# Patient Record
Sex: MALE | Race: BLACK OR AFRICAN AMERICAN | HISPANIC OR LATINO | URBAN - METROPOLITAN AREA
[De-identification: names, ages, dates, MRNs, and addresses within clinical notes are randomized per-mention and may not be internally consistent; named-entity substitution may affect disease eponyms.]

---

## 2020-08-26 ENCOUNTER — OUTPATIENT (OUTPATIENT)
Dept: OUTPATIENT SERVICES | Facility: HOSPITAL | Age: 30
LOS: 1 days | Discharge: HOME | End: 2020-08-26
Payer: COMMERCIAL

## 2020-08-26 VITALS
WEIGHT: 255.52 LBS | RESPIRATION RATE: 16 BRPM | TEMPERATURE: 98 F | DIASTOLIC BLOOD PRESSURE: 88 MMHG | OXYGEN SATURATION: 97 % | HEART RATE: 81 BPM | HEIGHT: 74 IN | SYSTOLIC BLOOD PRESSURE: 128 MMHG

## 2020-08-26 DIAGNOSIS — M51.26 OTHER INTERVERTEBRAL DISC DISPLACEMENT, LUMBAR REGION: ICD-10-CM

## 2020-08-26 DIAGNOSIS — Z01.818 ENCOUNTER FOR OTHER PREPROCEDURAL EXAMINATION: ICD-10-CM

## 2020-08-26 LAB
A1C WITH ESTIMATED AVERAGE GLUCOSE RESULT: 5.9 % — HIGH (ref 4–5.6)
ANION GAP SERPL CALC-SCNC: 8 MMOL/L — SIGNIFICANT CHANGE UP (ref 5–17)
APTT BLD: 35.7 SEC — HIGH (ref 27.5–35.5)
BUN SERPL-MCNC: 8 MG/DL — SIGNIFICANT CHANGE UP (ref 7–23)
CALCIUM SERPL-MCNC: 9.2 MG/DL — SIGNIFICANT CHANGE UP (ref 8.5–10.1)
CHLORIDE SERPL-SCNC: 106 MMOL/L — SIGNIFICANT CHANGE UP (ref 96–108)
CO2 SERPL-SCNC: 28 MMOL/L — SIGNIFICANT CHANGE UP (ref 22–31)
CREAT SERPL-MCNC: 0.83 MG/DL — SIGNIFICANT CHANGE UP (ref 0.5–1.3)
ESTIMATED AVERAGE GLUCOSE: 123 MG/DL — HIGH (ref 68–114)
GLUCOSE SERPL-MCNC: 81 MG/DL — SIGNIFICANT CHANGE UP (ref 70–99)
HCT VFR BLD CALC: 45.7 % — SIGNIFICANT CHANGE UP (ref 39–50)
HGB BLD-MCNC: 15 G/DL — SIGNIFICANT CHANGE UP (ref 13–17)
INR BLD: 1.13 RATIO — SIGNIFICANT CHANGE UP (ref 0.88–1.16)
MCHC RBC-ENTMCNC: 25.6 PG — LOW (ref 27–34)
MCHC RBC-ENTMCNC: 32.8 GM/DL — SIGNIFICANT CHANGE UP (ref 32–36)
MCV RBC AUTO: 78 FL — LOW (ref 80–100)
MRSA PCR RESULT.: SIGNIFICANT CHANGE UP
NRBC # BLD: 0 /100 WBCS — SIGNIFICANT CHANGE UP (ref 0–0)
PLATELET # BLD AUTO: 294 K/UL — SIGNIFICANT CHANGE UP (ref 150–400)
POTASSIUM SERPL-MCNC: 3.8 MMOL/L — SIGNIFICANT CHANGE UP (ref 3.5–5.3)
POTASSIUM SERPL-SCNC: 3.8 MMOL/L — SIGNIFICANT CHANGE UP (ref 3.5–5.3)
PROTHROM AB SERPL-ACNC: 13 SEC — SIGNIFICANT CHANGE UP (ref 10.6–13.6)
RBC # BLD: 5.86 M/UL — HIGH (ref 4.2–5.8)
RBC # FLD: 12.6 % — SIGNIFICANT CHANGE UP (ref 10.3–14.5)
S AUREUS DNA NOSE QL NAA+PROBE: DETECTED
SODIUM SERPL-SCNC: 142 MMOL/L — SIGNIFICANT CHANGE UP (ref 135–145)
WBC # BLD: 5.9 K/UL — SIGNIFICANT CHANGE UP (ref 3.8–10.5)
WBC # FLD AUTO: 5.9 K/UL — SIGNIFICANT CHANGE UP (ref 3.8–10.5)

## 2020-08-26 PROCEDURE — 93010 ELECTROCARDIOGRAM REPORT: CPT

## 2020-08-26 NOTE — H&P PST ADULT - NEGATIVE NEUROLOGICAL SYMPTOMS
no generalized seizures/no loss of sensation/no headache/no facial palsy/no focal seizures/no tremors/no hemiparesis/no weakness/no loss of consciousness/no syncope/no transient paralysis/no vertigo/no difficulty walking/no confusion

## 2020-08-26 NOTE — H&P PST ADULT - NSICDXPROBLEM_GEN_ALL_CORE_FT
PROBLEM DIAGNOSES  Problem: Other intervertebral disc displacement, lumbar region  Assessment and Plan: Preop instructions provided including npo status, Hibiclens wash for infection control and mrsa protocol. Pt to c/w current meds, (ozempic ok to continue on sundays 10 am as ordered) aware to stop any NSAIDS, OTC herbals or MVI on 9/2. Verbilized understanding. BW done, pending results. DVT prophylasix as per primary team. Aware to f/u on covid testing prior to sx as per pst protocol and appt provided for 9/6/20.

## 2020-08-26 NOTE — H&P PST ADULT - NEGATIVE MUSCULOSKELETAL SYMPTOMS
no arthritis/no muscle cramps/no joint swelling/no muscle weakness/no leg pain L/no arthralgia/no arm pain L/no myalgia/no neck pain/no arm pain R/no leg pain R

## 2020-08-26 NOTE — H&P PST ADULT - HISTORY OF PRESENT ILLNESS
31 y/o M presents to PST w/ a preop dx of other intervertebral disc displacement, lumbar region and to be evaluated for a scheduled left discectomy L3-4, right discectomy L4-5 with image on 9/9/20. Pt states since age 18 experience pain to back, no injury stated, underwent PT x 3, epidural shoots x 3 w/ partial relief done by pain mgt. pt referred to spine specialist, symptoms worsening including lle/ foot w/ paresthesias, MRI done and revealed herniated disc x 3, pt recommended surgical intervention at this time.

## 2020-08-26 NOTE — H&P PST ADULT - ASSESSMENT
DX: other intervertebral disc displacement, lumbar region and evaluated for a scheduled left discectomy L3-4, right discectomy L4-5 with image on 9/9/20.

## 2020-08-27 RX ORDER — MUPIROCIN 20 MG/G
1 OINTMENT TOPICAL
Qty: 10 | Refills: 0
Start: 2020-08-27 | End: 2020-08-31

## 2020-09-06 ENCOUNTER — OUTPATIENT (OUTPATIENT)
Dept: OUTPATIENT SERVICES | Facility: HOSPITAL | Age: 30
LOS: 1 days | Discharge: HOME | End: 2020-09-06

## 2020-09-06 DIAGNOSIS — U07.1 COVID-19: ICD-10-CM

## 2020-09-06 PROBLEM — E11.9 TYPE 2 DIABETES MELLITUS WITHOUT COMPLICATIONS: Chronic | Status: ACTIVE | Noted: 2020-08-26

## 2020-09-09 ENCOUNTER — OUTPATIENT (OUTPATIENT)
Dept: OUTPATIENT SERVICES | Facility: HOSPITAL | Age: 30
LOS: 1 days | Discharge: HOME | End: 2020-09-09

## 2020-09-09 VITALS
HEART RATE: 69 BPM | DIASTOLIC BLOOD PRESSURE: 79 MMHG | TEMPERATURE: 98 F | HEIGHT: 74 IN | SYSTOLIC BLOOD PRESSURE: 136 MMHG | WEIGHT: 250 LBS | OXYGEN SATURATION: 98 % | RESPIRATION RATE: 16 BRPM

## 2020-09-09 VITALS
HEART RATE: 82 BPM | RESPIRATION RATE: 18 BRPM | OXYGEN SATURATION: 98 % | SYSTOLIC BLOOD PRESSURE: 125 MMHG | DIASTOLIC BLOOD PRESSURE: 64 MMHG

## 2020-09-09 RX ORDER — SEMAGLUTIDE 0.68 MG/ML
1.5 INJECTION, SOLUTION SUBCUTANEOUS
Qty: 0 | Refills: 0 | DISCHARGE

## 2020-09-09 RX ORDER — SODIUM CHLORIDE 9 MG/ML
1000 INJECTION, SOLUTION INTRAVENOUS
Refills: 0 | Status: DISCONTINUED | OUTPATIENT
Start: 2020-09-09 | End: 2020-09-09

## 2020-09-09 RX ORDER — ACETAMINOPHEN 500 MG
1000 TABLET ORAL ONCE
Refills: 0 | Status: COMPLETED | OUTPATIENT
Start: 2020-09-09 | End: 2020-09-09

## 2020-09-09 RX ORDER — HYDROMORPHONE HYDROCHLORIDE 2 MG/ML
0.5 INJECTION INTRAMUSCULAR; INTRAVENOUS; SUBCUTANEOUS
Refills: 0 | Status: DISCONTINUED | OUTPATIENT
Start: 2020-09-09 | End: 2020-09-09

## 2020-09-09 RX ADMIN — HYDROMORPHONE HYDROCHLORIDE 0.5 MILLIGRAM(S): 2 INJECTION INTRAMUSCULAR; INTRAVENOUS; SUBCUTANEOUS at 11:18

## 2020-09-09 RX ADMIN — Medication 400 MILLIGRAM(S): at 10:44

## 2020-09-09 RX ADMIN — SODIUM CHLORIDE 75 MILLILITER(S): 9 INJECTION, SOLUTION INTRAVENOUS at 10:44

## 2020-09-09 RX ADMIN — HYDROMORPHONE HYDROCHLORIDE 0.5 MILLIGRAM(S): 2 INJECTION INTRAMUSCULAR; INTRAVENOUS; SUBCUTANEOUS at 10:52

## 2020-09-09 NOTE — ASU DISCHARGE PLAN (ADULT/PEDIATRIC) - ASU DC SPECIAL INSTRUCTIONSFT
ok to change dressing with sterile gauze post op day #3. Keep incision clean in dry. If gauze gets wet it must be changed. Do not cut sutures (stitches)

## 2020-09-09 NOTE — ASU DISCHARGE PLAN (ADULT/PEDIATRIC) - CALL YOUR DOCTOR IF YOU HAVE ANY OF THE FOLLOWING:
Swelling that gets worse/Wound/Surgical Site with redness, or foul smelling discharge or pus/Unable to urinate/Nausea and vomiting that does not stop/Fever greater than (need to indicate Fahrenheit or Celsius)/Bleeding that does not stop

## 2020-09-11 DIAGNOSIS — M51.16 INTERVERTEBRAL DISC DISORDERS WITH RADICULOPATHY, LUMBAR REGION: ICD-10-CM

## 2020-09-11 DIAGNOSIS — Z79.4 LONG TERM (CURRENT) USE OF INSULIN: ICD-10-CM

## 2020-09-11 DIAGNOSIS — G47.33 OBSTRUCTIVE SLEEP APNEA (ADULT) (PEDIATRIC): ICD-10-CM

## 2020-09-11 DIAGNOSIS — E11.9 TYPE 2 DIABETES MELLITUS WITHOUT COMPLICATIONS: ICD-10-CM

## 2023-04-27 ENCOUNTER — NON-APPOINTMENT (OUTPATIENT)
Age: 33
End: 2023-04-27

## 2023-04-27 PROBLEM — G47.30 SLEEP APNEA, UNSPECIFIED: Chronic | Status: ACTIVE | Noted: 2020-09-09

## 2023-04-28 ENCOUNTER — APPOINTMENT (OUTPATIENT)
Dept: CARDIOLOGY | Facility: CLINIC | Age: 33
End: 2023-04-28
Payer: MEDICAID

## 2023-04-28 ENCOUNTER — NON-APPOINTMENT (OUTPATIENT)
Age: 33
End: 2023-04-28

## 2023-04-28 VITALS
BODY MASS INDEX: 32.08 KG/M2 | HEART RATE: 70 BPM | WEIGHT: 250 LBS | DIASTOLIC BLOOD PRESSURE: 84 MMHG | OXYGEN SATURATION: 97 % | RESPIRATION RATE: 14 BRPM | HEIGHT: 74 IN | SYSTOLIC BLOOD PRESSURE: 134 MMHG

## 2023-04-28 VITALS — DIASTOLIC BLOOD PRESSURE: 86 MMHG | SYSTOLIC BLOOD PRESSURE: 130 MMHG

## 2023-04-28 DIAGNOSIS — Z78.9 OTHER SPECIFIED HEALTH STATUS: ICD-10-CM

## 2023-04-28 DIAGNOSIS — Z82.49 FAMILY HISTORY OF ISCHEMIC HEART DISEASE AND OTHER DISEASES OF THE CIRCULATORY SYSTEM: ICD-10-CM

## 2023-04-28 PROBLEM — Z00.00 ENCOUNTER FOR PREVENTIVE HEALTH EXAMINATION: Status: ACTIVE | Noted: 2023-04-28

## 2023-04-28 PROCEDURE — 99204 OFFICE O/P NEW MOD 45 MIN: CPT | Mod: 25

## 2023-04-28 PROCEDURE — 93000 ELECTROCARDIOGRAM COMPLETE: CPT

## 2023-04-28 RX ORDER — SERTRALINE HYDROCHLORIDE 100 MG/1
100 TABLET, FILM COATED ORAL DAILY
Refills: 0 | Status: ACTIVE | COMMUNITY

## 2023-04-28 RX ORDER — SERTRALINE HYDROCHLORIDE 50 MG/1
50 TABLET, FILM COATED ORAL
Refills: 0 | Status: ACTIVE | COMMUNITY
Start: 2023-04-28

## 2023-04-28 RX ORDER — SEMAGLUTIDE 0.68 MG/ML
2 INJECTION, SOLUTION SUBCUTANEOUS WEEKLY
Refills: 0 | Status: ACTIVE | COMMUNITY

## 2023-04-28 RX ORDER — ROSUVASTATIN CALCIUM 5 MG/1
5 TABLET, FILM COATED ORAL
Qty: 90 | Refills: 3 | Status: ACTIVE | COMMUNITY

## 2023-04-28 NOTE — HISTORY OF PRESENT ILLNESS
[FreeTextEntry1] : 32 year old male with PMHx of anxiety/depression, HLD presents for a cardiac evaluation. Since last Saturday patient has been experiencing intermittent chest pressure and palpitations. The chest pressure is random and there are no obvious triggers. Located in center of his chest with no radiation and lasts about 20 seconds. Patient also having palpitations associated with this chest pressure described as a flutter.\par \par There is no excessive stress, caffeine, or EtOH\par \par Mother with HTN but no other known cardiovascular disease in first degree relatives.\par \par There is no history of MI, CVA, CHF, or previous coronary intervention.\par

## 2023-04-28 NOTE — DISCUSSION/SUMMARY
[FreeTextEntry1] : 1. Chest Pain/Palpitations: normal electrolytes in December 2022. Recommend ETT and echocardiogram. Recommend 3 Day Zio.\par \par 2. HLD: continue rosuvastatin 5mg daily. LDL was 58, December 2022.\par \par Follow up after testing. [EKG obtained to assist in diagnosis and management of assessed problem(s)] : EKG obtained to assist in diagnosis and management of assessed problem(s)

## 2023-04-28 NOTE — PHYSICAL EXAM
[Normal] : moves all extremities, no focal deficits, normal speech [de-identified] : No carotid bruits auscultated bilaterally

## 2023-05-08 ENCOUNTER — APPOINTMENT (OUTPATIENT)
Dept: CARDIOLOGY | Facility: CLINIC | Age: 33
End: 2023-05-08
Payer: MEDICAID

## 2023-05-08 PROCEDURE — 93244 EXT ECG>48HR<7D REV&INTERPJ: CPT

## 2023-05-19 ENCOUNTER — APPOINTMENT (OUTPATIENT)
Dept: CARDIOLOGY | Facility: CLINIC | Age: 33
End: 2023-05-19
Payer: MEDICAID

## 2023-05-19 PROCEDURE — 93306 TTE W/DOPPLER COMPLETE: CPT

## 2023-05-19 PROCEDURE — 93015 CV STRESS TEST SUPVJ I&R: CPT

## 2023-05-23 ENCOUNTER — APPOINTMENT (OUTPATIENT)
Dept: CARDIOLOGY | Facility: CLINIC | Age: 33
End: 2023-05-23
Payer: MEDICAID

## 2023-05-23 VITALS
HEART RATE: 66 BPM | WEIGHT: 250 LBS | SYSTOLIC BLOOD PRESSURE: 130 MMHG | OXYGEN SATURATION: 97 % | DIASTOLIC BLOOD PRESSURE: 84 MMHG | HEIGHT: 74 IN | BODY MASS INDEX: 32.08 KG/M2

## 2023-05-23 DIAGNOSIS — R07.9 CHEST PAIN, UNSPECIFIED: ICD-10-CM

## 2023-05-23 DIAGNOSIS — E78.5 HYPERLIPIDEMIA, UNSPECIFIED: ICD-10-CM

## 2023-05-23 DIAGNOSIS — R00.2 PALPITATIONS: ICD-10-CM

## 2023-05-23 DIAGNOSIS — I10 ESSENTIAL (PRIMARY) HYPERTENSION: ICD-10-CM

## 2023-05-23 PROCEDURE — 99214 OFFICE O/P EST MOD 30 MIN: CPT

## 2023-05-23 RX ORDER — ENALAPRIL MALEATE 2.5 MG/1
2.5 TABLET ORAL DAILY
Refills: 0 | Status: DISCONTINUED | COMMUNITY
End: 2023-05-23

## 2023-05-23 RX ORDER — AMLODIPINE BESYLATE 5 MG/1
5 TABLET ORAL DAILY
Refills: 0 | Status: DISCONTINUED | COMMUNITY
End: 2023-05-23

## 2023-05-23 RX ORDER — LOSARTAN POTASSIUM AND HYDROCHLOROTHIAZIDE 12.5; 5 MG/1; MG/1
50-12.5 TABLET ORAL
Qty: 90 | Refills: 3 | Status: ACTIVE | COMMUNITY
Start: 2023-05-23 | End: 1900-01-01

## 2023-05-23 NOTE — PHYSICAL EXAM
[Normal] : moves all extremities, no focal deficits, normal speech [de-identified] : No carotid bruits auscultated bilaterally

## 2023-05-23 NOTE — DISCUSSION/SUMMARY
[FreeTextEntry1] : 1. Chest Pain/Palpitations: symptoms resolved normal electrolytes in December 2022. Normal non-invasive testing April-May 2023, which included ETT, echocardiogram and 3 Day Zio.\par \par 2. HLD: continue rosuvastatin 5mg daily. LDL was 58, December 2022.\par \par 3. HTN: recommend d/c enalapril and amlodipine. Start losartan/HCTZ 50/12.5mg daily. Goal BP less than 130/80. Recommend low salt diet.\par \par Follow up in 1 year.

## 2023-05-23 NOTE — CARDIOLOGY SUMMARY
[de-identified] : 4/28/2023, NSR [de-identified] : 4/28/2023, 3 Day Zio: NSR with rare ectopy. [de-identified] : 5/19/2023, Plain Treadmill Stress Test: Exercised for 9 minutes and 9 seconds utilizing Standard Mikel Protocol. No chest pain or abnormal dyspnea. No ECG changes to suggest ischemia.\par  [de-identified] : 5/19/2023, LV EF >65%, normal LV diastolic function, no significant valvular disease.

## 2023-07-13 NOTE — H&P PST ADULT - TEACHING/LEARNING RELIGIOUS CONSIDERATIONS
ACC: Ketty Farley RN    ACM followed up with patient to confirm the receipt of ACP documents. Patient denied receipt. ACM confirmed patients address which matched medical chart. ACM notified patient documents would be sent for a second time. Patient verbalized understanding to contact ACM once documentation arrives. Patient denied need for medication refill or immediate needs. Patient was encouraged to contact provider with new or adverse symptoms as they arise. Patient verbalized understanding.      Plan   ACM to follow-up in two weeks   Request ACP documents be sent again   Plan discharge     Ketty Farley RN  Ambulatory Care Manager   C. [phone] none

## 2024-04-24 ENCOUNTER — APPOINTMENT (OUTPATIENT)
Dept: CARDIOLOGY | Facility: CLINIC | Age: 34
End: 2024-04-24

## 2024-05-29 ENCOUNTER — APPOINTMENT (OUTPATIENT)
Dept: ORTHOPEDIC SURGERY | Facility: CLINIC | Age: 34
End: 2024-05-29
Payer: COMMERCIAL

## 2024-05-29 VITALS — BODY MASS INDEX: 32.08 KG/M2 | HEIGHT: 74 IN | WEIGHT: 250 LBS

## 2024-05-29 DIAGNOSIS — M47.816 SPONDYLOSIS W/OUT MYELOPATHY OR RADICULOPATHY, LUMBAR REGION: ICD-10-CM

## 2024-05-29 DIAGNOSIS — E11.9 TYPE 2 DIABETES MELLITUS W/OUT COMPLICATIONS: ICD-10-CM

## 2024-05-29 PROCEDURE — 99203 OFFICE O/P NEW LOW 30 MIN: CPT

## 2024-06-02 NOTE — DISCUSSION/SUMMARY
[de-identified] : 33 Y M w/ lumbar HNP & lumbar radiculopathy, s/p prior LT L3-4 & RT L4-5 discectomy (Dr. Hernandez).   Patient was educated and informed on their condition along with the expected outcomes. Discussed repeat injections w/ Dr. Bragg before proceeding w/ surgical intervention.  Discussed possible interventional spine injections vs surgical decompression dependent upon his response to conservative treatments, possible revision discectomy. Patient was provided with a referral for lumbar physical therapy to work on stretching, strengthening and range of motion. Patient was provided with a lumbar home exercise program.   F/U in 1-2 months.   Prior to appointment and during encounter with patient extensive medical records were reviewed including but not limited to, hospital records, out patient records, imaging results, and lab data. During this appointment the patient was examined, diagnoses were discussed and explained in a face to face manner. In addition extensive time was spent reviewing aforementioned diagnostic studies. Counseling including abnormal image results, differential diagnoses, treatment options, risk and benefits, lifestyle changes, current condition, and current medications was performed. Patient's comments, questions, and concerns were address and patient verbalized understanding. Based on this patient's presentation at our office, which is an orthopedic spine surgeon's office, this patient inherently / intrinsically has a risk, however minute, of developing issues such as Cauda equina syndrome, bowel and bladder changes, or progression of motor or neurological deficits such as paralysis which may be permanent.   I, Yesenia Gregory, attest that this documentation has been prepared under the direction and in the presence of provider Germán Crawford MD.

## 2024-06-02 NOTE — PHYSICAL EXAM
[Flexion] : flexion [Extension] : extension [Bending to left] : bending to left [Bending to right] : bending to right [de-identified] : Constitutional: - General Appearance: Unremarkable Body Habitus Well Developed Well Nourished Body Habitus No Deformities Well Groomed Ability To communicate: Normal Neurologic: Global sensation is intact to upper and lower extremities. See examination of Neck and/or Spine for exceptions. Orientation to Time, Place and Person is: Normal Mood And Affect is Normal Skin: - Head/Face, Right Upper/Lower Extremity, Left Upper/Lower Extremity: Normal See Examination of Neck and/or Spine for exceptions Cardiovascular: Peripheral Cardiovascular System is Normal Palpation of Lymph Nodes: Normal Palpation of lymph nodes in: Axilla, Cervical, Inguinal Abnormal Palpation of lymph nodes in: None  [] : non-antalgic [FreeTextEntry3] : well healed incision.  [de-identified] : L4 RT paresthesia's  [TWNoteComboBox7] : forward flexion 15 degrees

## 2024-06-02 NOTE — DATA REVIEWED
[FreeTextEntry1] : On my interpretation of these images from  11/2023 I have additionally reviewed the radiologist report. lumbar MRI- S/p LT L3-4 & RT L4-5 discectomy.  L5-S1: HNP, w/o abutment of cord  L4-5: residual or recurrent center to rt sided HNP L3-4:  residual or recurrent center to rt sided HNP   Doxepin Pregnancy And Lactation Text: This medication is Pregnancy Category C and it isn't known if it is safe during pregnancy. It is also excreted in breast milk and breast feeding isn't recommended.

## 2024-06-02 NOTE — HISTORY OF PRESENT ILLNESS
[Lower back] : lower back [Gradual] : gradual [5] : 5 [Radiating] : radiating [Tingling] : tingling [Full time] : Work status: full time [de-identified] : 05/29/2024: 33 Y M presenting today for an initial evaluation. Pt c/o RT sided back pain and RLE paresthesia's, symptoms began Sept 2023. Prior lumbar discectomy sx in 2020 w/ Dr. Hernandez, pre-operative symptoms of LT sided low back pain and LLE paresthesia's. Sx resolved pre-op pains. Now c/o rt sided low back and RLE pains.  Pain level is a 5/10, activity dependent. Seen by Dr. Bragg, proceeded w/ injection 02/2024, he reports relief for 2 months but decreasing in efficacy. PMHx: Type 2 Diabetes, controlled.   Occupation: IT    [] : no [FreeTextEntry5] : chronic pain ; has been seen in the past with Dr. David BARRIOS and had sx, now pain is more on the right side, no recent injury [FreeTextEntry6] : numbness [FreeTextEntry7] : down right leg [FreeTextEntry9] : back brace as needed [de-identified] : depending on activity [de-identified] : Dr. Hernandez [de-identified] : with Dr. Hernandez [de-identified] : in the past and MRI within the last year at [facility] [de-identified] : IT

## 2024-06-04 ENCOUNTER — APPOINTMENT (OUTPATIENT)
Dept: PAIN MANAGEMENT | Facility: CLINIC | Age: 34
End: 2024-06-04
Payer: COMMERCIAL

## 2024-06-04 VITALS — BODY MASS INDEX: 32.08 KG/M2 | WEIGHT: 250 LBS | HEIGHT: 74 IN

## 2024-06-04 DIAGNOSIS — M51.26 OTHER INTERVERTEBRAL DISC DISPLACEMENT, LUMBAR REGION: ICD-10-CM

## 2024-06-04 DIAGNOSIS — Z98.890 OTHER SPECIFIED POSTPROCEDURAL STATES: ICD-10-CM

## 2024-06-04 PROCEDURE — 99204 OFFICE O/P NEW MOD 45 MIN: CPT

## 2024-06-04 RX ORDER — MELOXICAM 15 MG/1
15 TABLET ORAL
Qty: 30 | Refills: 2 | Status: ACTIVE | COMMUNITY
Start: 2024-06-04 | End: 1900-01-01

## 2024-06-04 NOTE — ASSESSMENT
[FreeTextEntry1] : A discussion regarding available pain management treatment options occurred with the patient.  These included interventional, rehabilitative, pharmacological, and alternative modalities. We will proceed with the following:    Interventional treatment options:   - Proceed with right L4-L5, L5-S1 TFESI with fluoroscopic guidance - see additional instructions below    Rehabilitative options:   - initiate trial of physical therapy - participation in active HEP was discussed    Medication based treatment options:   - initiate trial of meloxicam 15 mg daily as needed  - see additional instructions below    Complementary treatment options:   - Weight management and lifestyle modifications discussed   - Workplace modifications discussed  Additional treatment recommendations as follows:   -Patient will follow-up with Dr. Crawford as directed - Follow up 1-2 weeks post injection for assessment of efficacy and further treatment recommendations  The risks, benefits and alternatives of the proposed procedure were explained in detail with the patient. The risks outlined include but are not limited to infection, bleeding, post- dural puncture headache, nerve injury, a temporary increase in pain, failure to resolve symptoms, need for future interventions, allergic reaction, and possible elevation of blood sugar in diabetics if using corticosteroid.  All questions were answered to patient's apparent satisfaction, and he/she verbalized an understanding.  We have discussed the risks, benefits, and alternatives for NSAID therapy including but not limited to the risk of bleeding, thrombosis, gastric mucosal irritation/ulceration, allergic reaction and kidney dysfunction.  The patient verbalizes an understanding.  The documentation recorded by the scribe, in my presence, accurately reflects the service I personally performed and the decisions made by me with my edits as appropriate.   I, Adriano Ye acting as scribe, attest that this documentation has been prepared under the direction and in the presence of Provider Torsten Mcdonnell DO.

## 2024-06-04 NOTE — REASON FOR VISIT
[Initial Consultation] : an initial pain management consultation [FreeTextEntry2] : Low back/right leg pain

## 2024-06-04 NOTE — HISTORY OF PRESENT ILLNESS
Writer gave report to Nurse Pimentel from patient facility, refuses to accept patient covid kit, request we send it to pharmacy on file.    [Lower back] : lower back [Gradual] : gradual [5] : 5 [Radiating] : radiating [Tingling] : tingling [Full time] : Work status: full time [FreeTextEntry1] : The patient presents for initial evaluation regarding their low back pain. Patient was referred by Dr. Crawford. Patient had undergone a left L3-4, Right L4-5 discectomy/hemilaminotomy with Dr. Hernandez and did well postoperatively.  Approximately 1 month ago he is starting to have a recent increase of pain. Patients pain is across the lower back with radiation down the lateral right leg, stopping at the knee (Pain Distribution: 85% low back / 15% right leg); he has had a recent lumbar epidural with Dr. Bragg which provided 6 weeks of relief.  Was prescribed physical therapy however has not yet started.  Subjective weakness: No  Lower extremity paresthesias: No  Bladder/bowel dysfunction: No   Injections:  1) Lumbar Epidural - Dr. Bragg  Pertinent Surgical History:  1) Left L3-4, Right L4-5 discectomy/hemilaminotomy (2020) - Dr. Hernandez  Imagin) MRI Lumbar Spine (2023) - [facility] There is straightening of the lumbar lordosis and congenitally spinal stenosis. There is no fracture or suspicious marrow signal abnormality.  L1-2: There is no disc herniation, significant central canal or neural foraminal stenosis. L2-3: There is mild bilateral facet arthropathy. There is no disc herniation, significant central canal or neural foraminal stenosis. L3-4: The patient is status post left hemilaminotomy and resection of the left ligamentum flavum. There is decrease in size of a previously noted large extruded left sided disc herniation, with what appears to be residual or recurrent disc herniation impinging upon the left L4 nerve root in the lateral recess. There is encroachment upon the left L3 nerve root in the neural foramen. There is moderate spinal stenosis. L4-5: There is disc bulge with superimposed central and right paracentral disc herniation, smaller than on the prior exam, compressing the thecal sac and right L5 nerve root. There is moderate spinal stenosis. L5-S1: There is moderate central and slightly left paracentral disc herniation, with suspected left S1 nerve root impingement. There is bilateral facet arthropathy encroaching upon the neural foramina bilaterally. There is no significant central canal stenosis.  Physician Disclaimer: I have personally reviewed and confirmed all HPI data with the patient.  [] : no [FreeTextEntry5] : chronic pain ; has been seen in the past with Dr. David BARRIOS and had sx, now pain is more on the right side, no recent injury [FreeTextEntry6] : numbness [FreeTextEntry7] : down right leg [FreeTextEntry9] : back brace as needed [de-identified] : depending on activity [de-identified] : Dr. Hernandez [de-identified] : with Dr. 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

## 2024-06-04 NOTE — PHYSICAL EXAM
[de-identified] : Constitutional:   - No acute distress   - Well developed; well nourished    Neurological:   - normal mood and affect   - alert and oriented x 3     Cardiovascular:   - grossly normal   Lumbar Spine Exam:   Inspection: Well healed surgical scar midline erythema (-) ecchymosis (-) rashes (-) alignment: no scoliosis   Palpation: Midline lumbar tenderness:            (-) midline thoracic tenderness:          (-) Lumbar paraspinal tenderness:  L (+); R (+) thoracic paraspinal tenderness: L (-) ; R (-) sciatic nerve tenderness :          L (-) ; R (-) SI joint tenderness:                     L (-) ; R (-) GTB tenderness:                        L (-);  R (-)   ROM: Reduced all planes  pain with extension > flexion   Strength:                                    Right       Left    Hip Flexion:                (5/5)       (5/5) Quadriceps:               (5/5)       (5/5) Hamstrings:                (5/5)       (5/5) Ankle Dorsiflexion:     (5/5)       (5/5) EHL:                           (5/5)       (5/5) Ankle Plantarflexion:  (5/5)       (5/5)   Special Tests: SLR:                            R (+) ; L (=) Facet loading:             R (+) ; L (+) HODAN test:                R (N/A) ; L (N/A) Hamstring tightness:   R (+);  L (+)   Neurologic: SILT throughout right lower extremity SILT throughout left lower extremity   Reflexes normal and symmetric bilateral lower extremities   Gait: non- antalgic gait ambulates without assistive device

## 2024-06-14 ENCOUNTER — APPOINTMENT (OUTPATIENT)
Dept: PAIN MANAGEMENT | Facility: CLINIC | Age: 34
End: 2024-06-14
Payer: COMMERCIAL

## 2024-06-14 DIAGNOSIS — M54.16 RADICULOPATHY, LUMBAR REGION: ICD-10-CM

## 2024-06-14 PROCEDURE — 64483 NJX AA&/STRD TFRM EPI L/S 1: CPT | Mod: RT

## 2024-06-14 PROCEDURE — 64484 NJX AA&/STRD TFRM EPI L/S EA: CPT | Mod: RT,59

## 2024-06-14 NOTE — PROCEDURE
[FreeTextEntry3] : Date of Service: 06/14/2024   Account: [id number]  Patient: APOLINAR MCCRAY   YOB: 1990  Age: 33 years  Surgeon:   Torsten Mcdonnell DO  Assistant:  None  Pre-Operative Diagnosis:   Lumbosacral radiculopathy  Post Operative Diagnosis:  Lumbosacral radiculopathy  Procedure:  Right L4-L5, L5-S1 transforaminal epidural steroid injection under fluoroscopic guidance  Anesthesia:            MAC   This procedure was carried out using fluoroscopic guidance.  The risks and benefits of the procedure were discussed extensively with the patient.  The consent of the patient was obtained and the following procedure was performed.  A timeout was performed with all essential staff present and the site and side were verified.  The right L4-L5 neural foramen was then identified on right oblique "tariq dog" anatomical view at the 6 o' clock position using fluoroscopic guidance, and the area was marked. The overlying skin and subcutaneous structures were anesthetized using sterile technique with 1% Lidocaine.   A 22-gauge 5-inch spinal needle was directed toward the inferior (6 o'clock) position of the pedicle, which formed the roof of the identified foramen.  Once in the epidural space, after negative aspiration for heme and CSF, 1cc of Omnipaque contrast was injected to confirm epidural location and assess filling defects and rule out intravascular needle placement.  Lumbar epidurogram showed no intravascular or intrathecal flow pattern.  No blood or CSF was aspirated.  Omnipaque spread medially in epidural space and outlined the exiting nerve root.  After this, 2.5 cc of a mixture of 4 cc of preservative free normal saline plus 40 mg of Kenalog was injected in the epidural space.  The right L5-S1 neural foramen and was then identified on right oblique "tariq dog" anatomical view at the 6 o' clock position using fluoroscopic guidance, and the area was marked.  The overlying skin and subcutaneous structures were anesthetized using sterile technique with 1% Lidocaine.   A 22-gauge 5-inch spinal needle was directed toward the inferior (6 o'clock) position of the pedicle, which formed the roof of the identified foramen.  Once in the epidural space, after negative aspiration for heme and CSF, 1cc of Omnipaque contrast was injected to confirm epidural location and assess filling defects and rule out intravascular needle placement.   Lumbar epidurogram showed no intravascular or intrathecal flow pattern.  No blood or CSF was aspirated.  Omnipaque spread medially in epidural space and outlined the exiting nerve root.   After this, the remainder of the injectate listed above was injected in the epidural space.  Vital signs remained normal throughout the procedure.  The patient tolerated the procedure well.  There were no immediate complications from the performed procedure.  The patient was instructed to apply ice over the injection sites for twenty minutes every two hours for the next 24 hours.  Disposition:      1. The patient was advised to F/U in 1-2 weeks to assess the response to the injection.      2. The patient was also instructed to contact me immediately if there were any concerns related to the procedure performed.

## 2024-07-02 ENCOUNTER — APPOINTMENT (OUTPATIENT)
Dept: PAIN MANAGEMENT | Facility: CLINIC | Age: 34
End: 2024-07-02

## 2024-07-31 ENCOUNTER — APPOINTMENT (OUTPATIENT)
Dept: ORTHOPEDIC SURGERY | Facility: CLINIC | Age: 34
End: 2024-07-31

## 2024-11-19 ENCOUNTER — NON-APPOINTMENT (OUTPATIENT)
Age: 34
End: 2024-11-19

## 2024-11-22 ENCOUNTER — NON-APPOINTMENT (OUTPATIENT)
Age: 34
End: 2024-11-22

## 2025-01-22 ENCOUNTER — APPOINTMENT (OUTPATIENT)
Dept: ORTHOPEDIC SURGERY | Facility: CLINIC | Age: 35
End: 2025-01-22

## 2025-04-04 ENCOUNTER — APPOINTMENT (OUTPATIENT)
Dept: ORTHOPEDIC SURGERY | Facility: CLINIC | Age: 35
End: 2025-04-04

## 2025-04-04 DIAGNOSIS — M54.16 RADICULOPATHY, LUMBAR REGION: ICD-10-CM

## 2025-04-04 DIAGNOSIS — Z98.890 OTHER SPECIFIED POSTPROCEDURAL STATES: ICD-10-CM

## 2025-04-04 PROCEDURE — 99213 OFFICE O/P EST LOW 20 MIN: CPT

## 2025-04-04 RX ORDER — METHYLPREDNISOLONE 4 MG/1
4 TABLET ORAL
Qty: 1 | Refills: 0 | Status: ACTIVE | COMMUNITY
Start: 2025-04-04 | End: 1900-01-01

## 2025-04-07 ENCOUNTER — APPOINTMENT (OUTPATIENT)
Dept: MRI IMAGING | Facility: CLINIC | Age: 35
End: 2025-04-07
Payer: COMMERCIAL

## 2025-04-07 PROCEDURE — 72158 MRI LUMBAR SPINE W/O & W/DYE: CPT

## 2025-04-11 ENCOUNTER — APPOINTMENT (OUTPATIENT)
Dept: ORTHOPEDIC SURGERY | Facility: CLINIC | Age: 35
End: 2025-04-11

## 2025-04-11 ENCOUNTER — TRANSCRIPTION ENCOUNTER (OUTPATIENT)
Age: 35
End: 2025-04-11

## 2025-04-11 VITALS — HEIGHT: 74 IN | BODY MASS INDEX: 32.08 KG/M2 | WEIGHT: 250 LBS

## 2025-04-11 PROCEDURE — 99214 OFFICE O/P EST MOD 30 MIN: CPT

## 2025-04-11 RX ORDER — TRAMADOL HYDROCHLORIDE 50 MG/1
50 TABLET, COATED ORAL EVERY 6 HOURS
Qty: 84 | Refills: 0 | Status: ACTIVE | COMMUNITY
Start: 2025-04-04 | End: 1900-01-01

## 2025-04-11 RX ORDER — PREGABALIN 75 MG/1
75 CAPSULE ORAL TWICE DAILY
Qty: 60 | Refills: 1 | Status: ACTIVE | COMMUNITY
Start: 2025-04-11 | End: 1900-01-01

## 2025-04-18 ENCOUNTER — APPOINTMENT (OUTPATIENT)
Dept: PAIN MANAGEMENT | Facility: CLINIC | Age: 35
End: 2025-04-18

## 2025-04-18 VITALS — WEIGHT: 250 LBS | BODY MASS INDEX: 32.08 KG/M2 | HEIGHT: 74 IN

## 2025-04-18 DIAGNOSIS — M47.816 SPONDYLOSIS W/OUT MYELOPATHY OR RADICULOPATHY, LUMBAR REGION: ICD-10-CM

## 2025-04-18 DIAGNOSIS — M54.16 RADICULOPATHY, LUMBAR REGION: ICD-10-CM

## 2025-04-18 DIAGNOSIS — Z98.890 OTHER SPECIFIED POSTPROCEDURAL STATES: ICD-10-CM

## 2025-04-18 DIAGNOSIS — M51.26 OTHER INTERVERTEBRAL DISC DISPLACEMENT, LUMBAR REGION: ICD-10-CM

## 2025-04-18 PROCEDURE — 99214 OFFICE O/P EST MOD 30 MIN: CPT

## 2025-05-16 ENCOUNTER — APPOINTMENT (OUTPATIENT)
Dept: PAIN MANAGEMENT | Facility: CLINIC | Age: 35
End: 2025-05-16
Payer: COMMERCIAL

## 2025-05-16 DIAGNOSIS — M54.16 RADICULOPATHY, LUMBAR REGION: ICD-10-CM

## 2025-05-16 PROCEDURE — 64484 NJX AA&/STRD TFRM EPI L/S EA: CPT | Mod: RT,59

## 2025-05-16 PROCEDURE — 64483 NJX AA&/STRD TFRM EPI L/S 1: CPT | Mod: RT

## 2025-06-05 ENCOUNTER — APPOINTMENT (OUTPATIENT)
Dept: PAIN MANAGEMENT | Facility: CLINIC | Age: 35
End: 2025-06-05
Payer: COMMERCIAL

## 2025-06-05 VITALS — BODY MASS INDEX: 31.95 KG/M2 | HEIGHT: 74 IN | WEIGHT: 249 LBS

## 2025-06-05 DIAGNOSIS — Z98.890 OTHER SPECIFIED POSTPROCEDURAL STATES: ICD-10-CM

## 2025-06-05 DIAGNOSIS — M54.16 RADICULOPATHY, LUMBAR REGION: ICD-10-CM

## 2025-06-05 DIAGNOSIS — M47.816 SPONDYLOSIS W/OUT MYELOPATHY OR RADICULOPATHY, LUMBAR REGION: ICD-10-CM

## 2025-06-05 DIAGNOSIS — M51.26 OTHER INTERVERTEBRAL DISC DISPLACEMENT, LUMBAR REGION: ICD-10-CM

## 2025-06-05 PROCEDURE — 99213 OFFICE O/P EST LOW 20 MIN: CPT

## 2025-06-19 ENCOUNTER — NON-APPOINTMENT (OUTPATIENT)
Age: 35
End: 2025-06-19